# Patient Record
Sex: MALE | Race: BLACK OR AFRICAN AMERICAN | Employment: UNEMPLOYED | URBAN - METROPOLITAN AREA
[De-identification: names, ages, dates, MRNs, and addresses within clinical notes are randomized per-mention and may not be internally consistent; named-entity substitution may affect disease eponyms.]

---

## 2018-03-01 ENCOUNTER — HOSPITAL ENCOUNTER (EMERGENCY)
Age: 33
Discharge: HOME | End: 2018-03-01
Attending: EMERGENCY MEDICINE
Payer: SELF-PAY

## 2018-03-01 VITALS
SYSTOLIC BLOOD PRESSURE: 138 MMHG | TEMPERATURE: 98.1 F | HEIGHT: 73 IN | HEART RATE: 85 BPM | OXYGEN SATURATION: 97 % | RESPIRATION RATE: 16 BRPM | DIASTOLIC BLOOD PRESSURE: 100 MMHG | BODY MASS INDEX: 31.81 KG/M2 | WEIGHT: 240 LBS

## 2018-03-01 DIAGNOSIS — H10.33 ACUTE CONJUNCTIVITIS OF BOTH EYES, UNSPECIFIED ACUTE CONJUNCTIVITIS TYPE: Primary | ICD-10-CM

## 2018-03-01 PROCEDURE — 99282 EMERGENCY DEPT VISIT SF MDM: CPT

## 2018-03-01 PROCEDURE — 74011000250 HC RX REV CODE- 250: Performed by: NURSE PRACTITIONER

## 2018-03-01 RX ORDER — POLYMYXIN B SULFATE AND TRIMETHOPRIM 1; 10000 MG/ML; [USP'U]/ML
1 SOLUTION OPHTHALMIC EVERY 6 HOURS
Qty: 10 ML | Refills: 0 | Status: SHIPPED | OUTPATIENT
Start: 2018-03-01 | End: 2018-03-06

## 2018-03-01 RX ORDER — ALBUTEROL SULFATE 0.83 MG/ML
5 SOLUTION RESPIRATORY (INHALATION)
COMMUNITY
End: 2020-10-20

## 2018-03-01 RX ADMIN — FLUORESCEIN SODIUM 2 STRIP: 1 STRIP OPHTHALMIC at 12:36

## 2018-03-01 NOTE — DISCHARGE INSTRUCTIONS
Pinkeye: Care Instructions  Your Care Instructions    Pinkeye is redness and swelling of the eye surface and the conjunctiva (the lining of the eyelid and the covering of the white part of the eye). Pinkeye is also called conjunctivitis. Pinkeye is often caused by infection with bacteria or a virus. Dry air, allergies, smoke, and chemicals are other common causes. Pinkeye often clears on its own in 7 to 10 days. Antibiotics only help if the pinkeye is caused by bacteria. Pinkeye caused by infection spreads easily. If an allergy or chemical is causing pinkeye, it will not go away unless you can avoid whatever is causing it. Follow-up care is a key part of your treatment and safety. Be sure to make and go to all appointments, and call your doctor if you are having problems. It's also a good idea to know your test results and keep a list of the medicines you take. How can you care for yourself at home? · Wash your hands often. Always wash them before and after you treat pinkeye or touch your eyes or face. · Use moist cotton or a clean, wet cloth to remove crust. Wipe from the inside corner of the eye to the outside. Use a clean part of the cloth for each wipe. · Put cold or warm wet cloths on your eye a few times a day if the eye hurts. · Do not wear contact lenses or eye makeup until the pinkeye is gone. Throw away any eye makeup you were using when you got pinkeye. Clean your contacts and storage case. If you wear disposable contacts, use a new pair when your eye has cleared and it is safe to wear contacts again. · If the doctor gave you antibiotic ointment or eyedrops, use them as directed. Use the medicine for as long as instructed, even if your eye starts looking better soon. Keep the bottle tip clean, and do not let it touch the eye area. · To put in eyedrops or ointment:  ¨ Tilt your head back, and pull your lower eyelid down with one finger.   ¨ Drop or squirt the medicine inside the lower lid.  ¨ Close your eye for 30 to 60 seconds to let the drops or ointment move around. ¨ Do not touch the ointment or dropper tip to your eyelashes or any other surface. · Do not share towels, pillows, or washcloths while you have pinkeye. When should you call for help? Call your doctor now or seek immediate medical care if:  ? · You have pain in your eye, not just irritation on the surface. ? · You have a change in vision or loss of vision. ? · You have an increase in discharge from the eye.   ? · Your eye has not started to improve or begins to get worse within 48 hours after you start using antibiotics. ? · Pinkeye lasts longer than 7 days. ? Watch closely for changes in your health, and be sure to contact your doctor if you have any problems. Where can you learn more? Go to http://garima-jeannie.info/. Enter Y392 in the search box to learn more about \"Pinkeye: Care Instructions. \"  Current as of: March 20, 2017  Content Version: 11.4  © 7450-3901 [employer]. Care instructions adapted under license by [employer] (which disclaims liability or warranty for this information). If you have questions about a medical condition or this instruction, always ask your healthcare professional. [address] any warranty or liability for your use of this information.

## 2018-03-01 NOTE — Clinical Note
Thank you for allowing us to care for you today. Please follow-up with your Primary Care provider in the next 2-3 days if your symptoms do not improve. Plan for home:  
 
Wash face and pat dry. When washing face use a different part of the wash clot h around each eye. Instill 1-2 drops of the Polytrim eye drop into both eyes. For the first 24 hours use it every 4 hours. For the remaining 4 days use the drops every 6 hours. Fifteen minutes later you may use Opcon-A for itchy eyes. You may also use l iquid tears, just wait 15 minutes after using antibiotic eye drops. If you wear contact lens stop wearing for the next 5 days and only wear your glasses. Bacterial conjunctivitis is contagious. It can easily be spread. Wash your hands before and after t ouching your eye or wiping your eye with a tissue. Use separate tissues for each eye.

## 2018-03-01 NOTE — ED PROVIDER NOTES
HPI Comments: Patient is a 68-year-old male who comes to the ED today via private vehicle as ordered by family member with complaints of bilateral eye itching and drainage. Patient states his symptoms started yesterday. His left eye and he is to keep with crusting and drainage. He denies any VA changes, pain with eye movement, fever, chills, cold or allergy symptoms. He is starting to have symptoms now in his right eye and felt should have it checked out. He has a past medical history significant for asthma. He has used his albuterol inhaler recently. He has no further complaints at this time. Primary care provider:none    The history is provided by the patient. No  was used. Past Medical History:   Diagnosis Date    Asthma        History reviewed. No pertinent surgical history. History reviewed. No pertinent family history. Social History     Social History    Marital status: SINGLE     Spouse name: N/A    Number of children: N/A    Years of education: N/A     Occupational History    Not on file. Social History Main Topics    Smoking status: Former Smoker    Smokeless tobacco: Never Used    Alcohol use Yes    Drug use: No    Sexual activity: Not on file     Other Topics Concern    Not on file     Social History Narrative    No narrative on file         ALLERGIES: Review of patient's allergies indicates no known allergies. Review of Systems   Constitutional: Negative for appetite change, chills and fever. HENT: Negative. Eyes: Positive for discharge, redness and itching. Negative for photophobia, pain and visual disturbance. Respiratory: Negative for cough, shortness of breath and wheezing. Cardiovascular: Negative for chest pain. Gastrointestinal: Negative for abdominal pain, constipation, diarrhea, nausea and vomiting. Genitourinary: Negative for dysuria and urgency. Musculoskeletal: Negative for back pain.    Skin: Negative for color change and rash. Neurological: Negative for dizziness and headaches. Psychiatric/Behavioral: Negative. All other systems reviewed and are negative. Vitals:    03/01/18 1226   BP: (!) 138/100   Pulse: 85   Resp: 16   Temp: 98.1 °F (36.7 °C)   SpO2: 97%   Weight: 108.9 kg (240 lb)   Height: 6' 1\" (1.854 m)            Physical Exam   Constitutional: He appears well-developed and well-nourished. HENT:   Head: Atraumatic. Eyes: EOM are normal. Pupils are equal, round, and reactive to light. Left eye exhibits chemosis and discharge. Left eye exhibits no exudate and no hordeolum. No foreign body present in the left eye. Left conjunctiva is injected. Left conjunctiva has no hemorrhage. No scleral icterus. Right eye exhibits normal extraocular motion. Left eye exhibits normal extraocular motion. Slit lamp exam:       The right eye shows no corneal abrasion, no corneal flare, no corneal ulcer, no foreign body, no hyphema, no hypopyon and no fluorescein uptake. The left eye shows no corneal abrasion, no corneal flare, no corneal ulcer, no foreign body, no hyphema, no hypopyon and no fluorescein uptake. Right eye has a dry surface. 20/25  Left eye with inflamed lid margins. 20/20  Bilateral VA 20/20   Neck: No tracheal deviation present. Pulmonary/Chest: Effort normal. No respiratory distress. Musculoskeletal: Normal range of motion. Neurological: He is alert. Skin: Skin is warm and dry. Psychiatric: He has a normal mood and affect. His behavior is normal. Judgment and thought content normal.   Nursing note and vitals reviewed. [facility]      ED Course       Eye Stain    Date/Time: 3/1/2018 1:10 PM    Performed by: NP        Corneal abrasion was not present on eyelid eversion. Cornea is clear. Anterior chamber is clear.     Patient tolerance: Patient tolerated the procedure well with no immediate complications  My total time at bedside, performing this procedure was 1-15 minutes. Bacterial vs viral vs allergic conjunctivitis. Will treat as bacterial. Polytrim & OpCon A for itching. 1:10 PM  The patient has been reevaluated. The patient is ready for discharge. The patient's signs, symptoms, diagnosis, and discharge instructions have been discussed and the patient/ family has conveyed their understanding. The patient is to follow up as recommended or return to the ED should their symptoms worsen. Plan has been discussed and the patient is in agreement. LABORATORY TESTS:  Labs Reviewed - No data to display    IMAGING RESULTS:  No results found. MEDICATIONS GIVEN:  Medications   fluorescein (FUL-SENIA) 1 mg ophthalmic strip 2 Strip (2 Strips Both Eyes Given by Provider 3/1/18 0919)       IMPRESSION:  1. Acute conjunctivitis of both eyes, unspecified acute conjunctivitis type        PLAN:  1. Current Discharge Medication List      START taking these medications    Details   trimethoprim-polymyxin b (POLYTRIM) ophthalmic solution Administer 1 Drop to both eyes every six (6) hours for 5 days. Indications: BACTERIAL CONJUNCTIVITIS  Qty: 10 mL, Refills: 0      naphazoline-pheniramine (OPCON-A) ophthalmic solution Administer 1 Drop to both eyes three (3) times daily as needed. Indications: OCULAR ITCHING  Qty: 5 mL, Refills: 0           2. Follow-up Information     Follow up With Details Comments [id number] Schedule an appointment as soon as possible for a visit As needed. Luis M Schedule an appointment as soon as possible for a visit As needed. Primary care referral [address] [place] [address] Ricardo Duran [address]  [phone]    [facility] EMERGENCY DEPT  As needed, If symptoms worsen Narendra [address] [place] [address]  [phone]        3.      Return to ED for new or worsening symptoms       Antonia Schulte Salma Pringle, NP

## 2018-03-01 NOTE — ED TRIAGE NOTES
Pt ambulates to treatment area he states that the day before yesterday he was in his old car touching the stearing wheel and other things then he thinks he touched his left eye. When he got up yesterday the left eye was \"messing up\" and it was swollen and tearing.   Today the right eye is beginning to \"mess up\"  So he thinks it might be from his old car

## 2018-03-01 NOTE — ED NOTES
Pt given discharge instructions by Jessica Durán NP he verbalizes an understanding pt stable at time of discharge

## 2018-03-11 ENCOUNTER — HOSPITAL ENCOUNTER (EMERGENCY)
Age: 33
Discharge: HOME | End: 2018-03-11
Attending: EMERGENCY MEDICINE
Payer: SELF-PAY

## 2018-03-11 VITALS
OXYGEN SATURATION: 98 % | HEART RATE: 78 BPM | WEIGHT: 244.05 LBS | DIASTOLIC BLOOD PRESSURE: 80 MMHG | SYSTOLIC BLOOD PRESSURE: 135 MMHG | HEIGHT: 73 IN | RESPIRATION RATE: 18 BRPM | BODY MASS INDEX: 32.34 KG/M2 | TEMPERATURE: 98.2 F

## 2018-03-11 DIAGNOSIS — H57.89 IRRITATION OF LEFT EYE: Primary | ICD-10-CM

## 2018-03-11 PROCEDURE — 99283 EMERGENCY DEPT VISIT LOW MDM: CPT

## 2018-03-11 PROCEDURE — 74011000250 HC RX REV CODE- 250: Performed by: EMERGENCY MEDICINE

## 2018-03-11 RX ORDER — ALBUTEROL SULFATE 90 UG/1
AEROSOL, METERED RESPIRATORY (INHALATION)
COMMUNITY
End: 2020-10-20

## 2018-03-11 RX ORDER — TETRACAINE HYDROCHLORIDE 5 MG/ML
1 SOLUTION OPHTHALMIC
Status: COMPLETED | OUTPATIENT
Start: 2018-03-11 | End: 2018-03-11

## 2018-03-11 RX ADMIN — TETRACAINE HYDROCHLORIDE 1 DROP: 5 SOLUTION OPHTHALMIC at 21:26

## 2018-03-11 RX ADMIN — FLUORESCEIN SODIUM 1 STRIP: 1 STRIP OPHTHALMIC at 21:26

## 2018-03-12 NOTE — ED NOTES
Patient discharged home after receiving discharge instructions from MD.  Patient and family voiced understanding and doesn't have any questions at this time. Patient in no distress at this time.

## 2018-03-12 NOTE — ED PROVIDER NOTES
HPI Comments: Cameron Encarnacoin is a 28 yo M with left eye redness and mild pain for the past 2 weeks. He was seen here on 3/1 and was prescribed polytrim drops which he had been taking but the eye pain and redness is not improving. [facility] but never made an appointment. He describes the pain and an itching discomfort that increases when he opens his eye in bright light. He has had colorless crusting when he wakes from sleep and watery drainage. He denies vision changes but states that it is hard to get his left eye lid open to be able to see because it feels swollen. Past Medical History:   Diagnosis Date    Asthma        History reviewed. No pertinent surgical history. History reviewed. No pertinent family history. Social History     Social History    Marital status: SINGLE     Spouse name: N/A    Number of children: N/A    Years of education: N/A     Occupational History    Not on file. Social History Main Topics    Smoking status: Former Smoker    Smokeless tobacco: Never Used    Alcohol use Yes    Drug use: No    Sexual activity: Not on file     Other Topics Concern    Not on file     Social History Narrative         ALLERGIES: Review of patient's allergies indicates no known allergies. Review of Systems   Constitutional: Negative for fever. HENT: Negative for sore throat. Eyes: Positive for discharge, redness and itching. Negative for visual disturbance. Respiratory: Negative for cough. Cardiovascular: Negative for chest pain. Gastrointestinal: Negative for abdominal pain. Genitourinary: Negative for dysuria. Musculoskeletal: Negative for back pain. Skin: Negative for rash. Neurological: Negative for headaches.        Vitals:    03/11/18 2045   BP: 140/90   Pulse: 85   Resp: 16   Temp: 98.2 °F (36.8 °C)   SpO2: 98%   Weight: 110.7 kg (244 lb 0.8 oz)   Height: 6' 1\" (1.854 m)            Physical Exam Constitutional: He appears well-developed and well-nourished. No distress. HENT:   Head: Normocephalic and atraumatic. Mouth/Throat: Oropharynx is clear and moist.   Eyes: EOM are normal. Pupils are equal, round, and reactive to light. No foreign body present in the right eye. No foreign body present in the left eye. Right conjunctiva is not injected. Left conjunctiva is injected. Right eye exhibits normal extraocular motion. Left eye exhibits normal extraocular motion. Slit lamp exam:       The left eye shows no corneal abrasion, no corneal ulcer, no foreign body and no fluorescein uptake. tonopen  OD 19  OS 15    Visual acuity  OD 20/20  OS 20/50  Bilateral 20/20   Neck: Normal range of motion and phonation normal.   Cardiovascular: Normal rate and intact distal pulses. Pulmonary/Chest: Effort normal. No respiratory distress. Abdominal: He exhibits no distension. Musculoskeletal: Normal range of motion. He exhibits no tenderness. Neurological: He is alert. He is not disoriented. He exhibits normal muscle tone. Skin: Skin is warm and dry. Nursing note and vitals reviewed. MDM    Patients with persistent left eye redness and irritation following 2 week treatment with polytrim. May be allergic conjunctivitis or other cause. No corneal abrasion seen with fluorescin exam.   kaylie pen pressures normal.  May be allergic conjunctivitis, caused by polytrim use. Does not appear bacterial at this time. Advised to discontinue polytrim. Follow-up with [facility].    ED Course       Procedures

## 2018-03-12 NOTE — ED TRIAGE NOTES
Started with left eye, but now both eyes are red per patient. Started almost 2 weeks ago and was seen here. Has been using drops that he was given, but not getting better. Drainage from left eye, unsure of color. No pain.

## 2018-03-12 NOTE — ED NOTES
[facility] communication provided and informed of purposeful rounding to include collaboration of entire care team; patient acknowledged understanding. Pt watching TV. Call bell within reach, will continue to monitor.

## 2020-10-20 ENCOUNTER — APPOINTMENT (OUTPATIENT)
Dept: GENERAL RADIOLOGY | Age: 35
End: 2020-10-20
Attending: EMERGENCY MEDICINE
Payer: MEDICAID

## 2020-10-20 ENCOUNTER — HOSPITAL ENCOUNTER (EMERGENCY)
Age: 35
Discharge: HOME | End: 2020-10-20
Attending: EMERGENCY MEDICINE
Payer: MEDICAID

## 2020-10-20 VITALS
DIASTOLIC BLOOD PRESSURE: 91 MMHG | RESPIRATION RATE: 20 BRPM | WEIGHT: 235.67 LBS | HEART RATE: 115 BPM | OXYGEN SATURATION: 98 % | HEIGHT: 73 IN | TEMPERATURE: 98.1 F | SYSTOLIC BLOOD PRESSURE: 160 MMHG | BODY MASS INDEX: 31.23 KG/M2

## 2020-10-20 DIAGNOSIS — R06.02 SOB (SHORTNESS OF BREATH): ICD-10-CM

## 2020-10-20 DIAGNOSIS — J06.9 VIRAL URI WITH COUGH: Primary | ICD-10-CM

## 2020-10-20 DIAGNOSIS — R07.89 CHEST TIGHTNESS: ICD-10-CM

## 2020-10-20 DIAGNOSIS — J45.901 ASTHMA WITH ACUTE EXACERBATION, UNSPECIFIED ASTHMA SEVERITY, UNSPECIFIED WHETHER PERSISTENT: ICD-10-CM

## 2020-10-20 PROCEDURE — 71045 X-RAY EXAM CHEST 1 VIEW: CPT

## 2020-10-20 PROCEDURE — 87635 SARS-COV-2 COVID-19 AMP PRB: CPT

## 2020-10-20 PROCEDURE — 74011636637 HC RX REV CODE- 636/637: Performed by: EMERGENCY MEDICINE

## 2020-10-20 PROCEDURE — 99285 EMERGENCY DEPT VISIT HI MDM: CPT

## 2020-10-20 PROCEDURE — 74011000250 HC RX REV CODE- 250: Performed by: EMERGENCY MEDICINE

## 2020-10-20 PROCEDURE — 93005 ELECTROCARDIOGRAM TRACING: CPT

## 2020-10-20 PROCEDURE — 94640 AIRWAY INHALATION TREATMENT: CPT

## 2020-10-20 RX ORDER — PREDNISONE 20 MG/1
60 TABLET ORAL
Status: COMPLETED | OUTPATIENT
Start: 2020-10-20 | End: 2020-10-20

## 2020-10-20 RX ORDER — PREDNISONE 20 MG/1
40 TABLET ORAL
Qty: 10 TAB | Refills: 0 | Status: SHIPPED | OUTPATIENT
Start: 2020-10-20 | End: 2020-10-25

## 2020-10-20 RX ORDER — ALBUTEROL SULFATE 90 UG/1
2 AEROSOL, METERED RESPIRATORY (INHALATION)
Qty: 1 INHALER | Refills: 0 | Status: SHIPPED | OUTPATIENT
Start: 2020-10-20

## 2020-10-20 RX ORDER — ALBUTEROL SULFATE 90 UG/1
2 AEROSOL, METERED RESPIRATORY (INHALATION)
COMMUNITY

## 2020-10-20 RX ORDER — GUAIFENESIN 1200 MG/1
1200 TABLET, EXTENDED RELEASE ORAL 2 TIMES DAILY
Qty: 10 TAB | Refills: 0 | Status: SHIPPED | OUTPATIENT
Start: 2020-10-20 | End: 2021-09-01

## 2020-10-20 RX ORDER — IPRATROPIUM BROMIDE AND ALBUTEROL SULFATE 2.5; .5 MG/3ML; MG/3ML
3 SOLUTION RESPIRATORY (INHALATION)
Status: COMPLETED | OUTPATIENT
Start: 2020-10-20 | End: 2020-10-20

## 2020-10-20 RX ORDER — PSEUDOEPHEDRINE HCL 30 MG
30 TABLET ORAL
Qty: 15 TAB | Refills: 0 | Status: SHIPPED | OUTPATIENT
Start: 2020-10-20 | End: 2020-10-24

## 2020-10-20 RX ADMIN — IPRATROPIUM BROMIDE AND ALBUTEROL SULFATE 3 ML: .5; 3 SOLUTION RESPIRATORY (INHALATION) at 22:54

## 2020-10-20 RX ADMIN — PREDNISONE 60 MG: 20 TABLET ORAL at 21:44

## 2020-10-20 NOTE — Clinical Note
[facility] EMERGENCY DEPT 
[address] [facility] [address] 
[phone] Work/School Note Date: 10/20/2020 To Whom It May concern: 
 
Amara Bella was evaulated by the following provider(s): 
Attending Provider: Nick Tran MD.   COVID19 virus is suspected. Per the CDC guidelines we recommend home isolation until the following conditions are all met: 1. At least 10 days have passed since symptoms first appeared and 2. At least 24 hours have passed since last fever without the use of fever-reducing medications and 
3. Symptoms (e.g., cough, shortness of breath) have improved Sincerely, 
 
 
 
 
Jesenia Rogers MD

## 2020-10-21 ENCOUNTER — PATIENT OUTREACH (OUTPATIENT)
Dept: CASE MANAGEMENT | Age: 35
End: 2020-10-21

## 2020-10-21 LAB
ATRIAL RATE: 124 BPM
CALCULATED P AXIS, ECG09: 68 DEGREES
CALCULATED R AXIS, ECG10: 94 DEGREES
CALCULATED T AXIS, ECG11: 26 DEGREES
COVID-19, XGCOVT: NOT DETECTED
DIAGNOSIS, 93000: NORMAL
HEALTH STATUS, XMCV2T: NORMAL
P-R INTERVAL, ECG05: 154 MS
Q-T INTERVAL, ECG07: 308 MS
QRS DURATION, ECG06: 86 MS
QTC CALCULATION (BEZET), ECG08: 442 MS
SPECIMEN TYPE, XMCV1T: NORMAL
VENTRICULAR RATE, ECG03: 124 BPM

## 2020-10-21 NOTE — ED PROVIDER NOTES
70-year-old male who has asthma presenting to the ER with 2 days of URI-like symptoms and the onset of shortness of breath and wheezing yesterday. Patient denies any fevers however having nasal congestion and a nonproductive cough. Wheezing with some chest tightness. Patient reports that he has been using his inhaler at home without improvement of his symptoms. No myalgias. No loss of taste or smell. No ear pain. No abdominal pain nausea vomiting or diarrhea no dysuria. No rash. No other sick contacts. Patient has not been taking any medications other than his inhaler at home. Patient has history of ICU admission with BiPAP due to asthma exacerbation from a cold several years ago. Other not patient reports his asthma has been controlled           Past Medical History:   Diagnosis Date    Asthma        History reviewed. No pertinent surgical history. History reviewed. No pertinent family history. Social History     Socioeconomic History    Marital status: SINGLE     Spouse name: Not on file    Number of children: Not on file    Years of education: Not on file    Highest education level: Not on file   Occupational History    Not on file   Social Needs    Financial resource strain: Not on file    Food insecurity     Worry: Not on file     Inability: Not on file    Transportation needs     Medical: Not on file     Non-medical: Not on file   Tobacco Use    Smoking status: Former Smoker    Smokeless tobacco: Never Used   Substance and Sexual Activity    Alcohol use:  Yes    Drug use: No    Sexual activity: Not on file   Lifestyle    Physical activity     Days per week: Not on file     Minutes per session: Not on file    Stress: Not on file   Relationships    Social connections     Talks on phone: Not on file     Gets together: Not on file     Attends [employer] service: Not on file     Active member of club or organization: Not on file     Attends meetings of clubs or organizations: Not on file     Relationship status: Not on file    Intimate partner violence     Fear of current or ex partner: Not on file     Emotionally abused: Not on file     Physically abused: Not on file     Forced sexual activity: Not on file   Other Topics Concern    Not on file   Social History Narrative    Not on file         ALLERGIES: Patient has no known allergies. Review of Systems   Constitutional: Negative for chills and fever. HENT: Positive for congestion, postnasal drip, rhinorrhea and sore throat. Eyes: Negative for pain. Respiratory: Positive for cough, chest tightness, shortness of breath and wheezing. Cardiovascular: Negative for chest pain. Gastrointestinal: Negative for abdominal pain, diarrhea, nausea and vomiting. Genitourinary: Negative for dysuria and flank pain. Musculoskeletal: Negative for back pain and neck pain. Skin: Negative for rash. Neurological: Negative for dizziness and headaches. Vitals:    10/20/20 2145 10/20/20 2200 10/20/20 2215 10/20/20 2300   BP: (!) 145/90 (!) 147/80 (!) 140/88 (!) 160/91   Pulse: (!) 115      Resp:    20   Temp:       SpO2: 98% 97% 95% 98%   Weight:       Height:                Physical Exam  Constitutional:       Appearance: He is well-developed. HENT:      Head: Normocephalic. Comments: B/l serous TM effusions. No bulging or drainage  Nasal congestion  Posterior oropharynx erythema, cobblestoning appearance. No edema, no enlarge tonsils or exduate. Eyes:      Conjunctiva/sclera: Conjunctivae normal.   Neck:      Musculoskeletal: Normal range of motion and neck supple. Cardiovascular:      Rate and Rhythm: Normal rate and regular rhythm. Pulmonary:      Effort: Pulmonary effort is normal. Tachypnea and prolonged expiration present. No accessory muscle usage or respiratory distress. Breath sounds: Wheezing (diffuse wheezing, poor air movement) present. No rhonchi or rales.    Abdominal:      General: Bowel sounds are normal.      Palpations: Abdomen is soft. Tenderness: There is no abdominal tenderness. Musculoskeletal: Normal range of motion. Skin:     General: Skin is warm. Capillary Refill: Capillary refill takes less than 2 seconds. Findings: No rash. Neurological:      Mental Status: He is alert and oriented to person, place, and time. Comments: No gross motor or sensory deficits          MDM  Number of Diagnoses or Management Options  Asthma with acute exacerbation, unspecified asthma severity, unspecified whether persistent:   Chest tightness:   SOB (shortness of breath):   Viral URI with cough:   Diagnosis management comments: Patient presenting with URI-like symptoms with exacerbation of his asthma not controlled with his rescue inhaler at home. Patient came in tachypneic with poor air movement and diffuse wheezing. No reported fevers. Had patient take 6 puffs of his home inhaler and given steroids. Repeated the 6 puffs of his home albuterol inhaler  Patient chest x-ray unremarkable. Patient had some mild improvement of his respiratory rate and was having improved air movement but still having wheezing. Given dual neb, after dual neb patient reports significant improvement of his breathing. Patient had good air movement with intermittent wheezes. Light of his asthma status and viral URI-like symptoms swab for Covid. Patient reports feeling well enough to be discharged home. Discussed symptomatic treatment and prescription for steroids for next 5 days. Discussed self-isolation until he is Covid results returned and his symptoms improved. Discussed follow-up Covid results on MyChart  Discussed the discharge impression and any labs and the results with the patient. Answered any questions and addressed any concerns.  Discussed the importance of following up with their primary care provider and/or specialist.  Discussed signs or symptoms that would warrant return back to the ER for further evaluation. The patient is agreeable with discharge. Amount and/or Complexity of Data Reviewed  Clinical lab tests: reviewed  Tests in the radiology section of CPT®: reviewed  Tests in the medicine section of CPT®: reviewed      ED Course as of Oct 20 2341   Tue Oct 20, 2020   2125 EKG: Sinus tachycardia rate of 124 bpm.  Normal intervals. Rightward axis, no ST elevation or depressions EKG interpreted by Stephy Davies MD      [ZD]   2139 Patient take 6 puffs of his home inhaler. [ZD]   7605 Patient taken additional 6 puffs of his home albuterol inhaler    [ZD]   8428 Reevaluation of lung status. Wheezing sounds louder than previous. Patient reports mild improvement of symptoms.   Will give patient duoneb    [ZD]      ED Course User Index  [ZD] Aurora Walsh MD       Critical Care  Performed by: Aurora Walsh MD  Authorized by: Aurora Walsh MD     Critical care provider statement:     Critical care time (minutes):  35    Critical care was necessary to treat or prevent imminent or life-threatening deterioration of the following conditions:  Respiratory failure    Critical care was time spent personally by me on the following activities:  Development of treatment plan with patient or surrogate, evaluation of patient's response to treatment, interpretation of cardiac output measurements, obtaining history from patient or surrogate, re-evaluation of patient's condition, pulse oximetry, ordering and review of radiographic studies, ordering and performing treatments and interventions and review of old charts    I assumed direction of critical care for this patient from another provider in my specialty: no            Recent Results (from the past 24 hour(s))   EKG, 12 LEAD, INITIAL    Collection Time: 10/20/20  9:25 PM   Result Value Ref Range    Ventricular Rate 124 BPM    Atrial Rate 124 BPM    P-R Interval 154 ms    QRS Duration 86 ms    Q-T Interval 308 ms    QTC Calculation (Bezet) 442 ms    Calculated P Axis 68 degrees    Calculated R Axis 94 degrees    Calculated T Axis 26 degrees    Diagnosis       Sinus tachycardia  Rightward axis  Borderline ECG  No previous ECGs available     SARS-COV-2    Collection Time: 10/20/20 11:00 PM   Result Value Ref Range    Specimen source Nasopharyngeal      SARS-CoV-2 PENDING     SARS-CoV-2 PENDING     Specimen source Nasopharyngeal      COVID-19 rapid test PENDING     Specimen type NP Swab      Health status PENDING     COVID-19 PENDING        Xr Chest Port    Result Date: 10/20/2020  EXAM:  XR CHEST PORT INDICATION:   cough, sob, wheezing COMPARISON: None. FINDINGS: AP radiograph of the chest was obtained. No evidence of focal consolidation. No pleural effusion or pneumothorax. Heart, dayana, mediastinum are within normal limits. No acute osseous abnormalities. IMPRESSION: No acute cardiopulmonary process.

## 2020-10-21 NOTE — ED TRIAGE NOTES
Pt rpts 2 day hx of astham exacerbation. Audible wheezing during triage. + minimal productive cough.

## 2020-10-21 NOTE — ED NOTES
Discharge note: The patient was discharged home in stable condition, accompanied by family member. The patient is alert and oriented, is in no respiratory distress. The patient's diagnosis, condition and treatment were explained to patient by Dr Asa Daugherty and reinforced by nurse. The patient expressed understanding of discharge instructions, prescriptions, and plan of care. A work note was given to pt. A discharge plan has been developed. A  was not involved in the process. Patient offered a wheelchair to ED lobby for discharge but declined at this time. Patient ambulatory to ED lobby to go home with family member.

## 2020-10-21 NOTE — PROGRESS NOTES
ED 10/20/2020: Viral URI/Cough/Asthma with acute exacerbation, unspecified asthma severity  Patient on Cov19 D/C [facility] Enrollment Report/fu Contact. Left message on voice mail with my contact information for return call. Need to assess for d/c needs post ED or Inpatient Admission.

## 2020-10-22 ENCOUNTER — PATIENT OUTREACH (OUTPATIENT)
Dept: CASE MANAGEMENT | Age: 35
End: 2020-10-22

## 2020-10-22 NOTE — PROGRESS NOTES
ED 10/20/2020: Viral URI/Cough/Asthma with acute exacerbation, unspecified asthma severity f/u  Patient resolved from Transition of Care episode on 10/22/2020. ACM/CTN was unsuccessful at contacting this patient today. Patient/family was not provided due to inability to reach,  the following resources and education related to COVID-19 during the initial call:                         Signs, symptoms and red flags related to COVID-19            CDC exposure and quarantine guidelines            [facility] exposure contact - [phone]            Contact for their local Department of Health                 Patient has not had any additional ED or hospital visits. No further outreach scheduled with this CTN/ACM. Episode of Care resolved. Patient has this CTN/ACM contact information if future needs arise. .cov  .covid  .

## 2021-09-01 ENCOUNTER — APPOINTMENT (OUTPATIENT)
Dept: GENERAL RADIOLOGY | Age: 36
End: 2021-09-01
Attending: EMERGENCY MEDICINE
Payer: MEDICAID

## 2021-09-01 ENCOUNTER — HOSPITAL ENCOUNTER (EMERGENCY)
Age: 36
Discharge: HOME | End: 2021-09-01
Attending: EMERGENCY MEDICINE
Payer: MEDICAID

## 2021-09-01 VITALS
SYSTOLIC BLOOD PRESSURE: 170 MMHG | TEMPERATURE: 99.1 F | DIASTOLIC BLOOD PRESSURE: 100 MMHG | HEIGHT: 73 IN | WEIGHT: 246.25 LBS | RESPIRATION RATE: 18 BRPM | HEART RATE: 93 BPM | OXYGEN SATURATION: 97 % | BODY MASS INDEX: 32.64 KG/M2

## 2021-09-01 DIAGNOSIS — R07.89 CHEST WALL PAIN: Primary | ICD-10-CM

## 2021-09-01 DIAGNOSIS — S39.012A BACK STRAIN, INITIAL ENCOUNTER: ICD-10-CM

## 2021-09-01 PROCEDURE — 99282 EMERGENCY DEPT VISIT SF MDM: CPT

## 2021-09-01 PROCEDURE — 74011250636 HC RX REV CODE- 250/636: Performed by: EMERGENCY MEDICINE

## 2021-09-01 PROCEDURE — 96372 THER/PROPH/DIAG INJ SC/IM: CPT

## 2021-09-01 PROCEDURE — 71046 X-RAY EXAM CHEST 2 VIEWS: CPT

## 2021-09-01 RX ORDER — KETOROLAC TROMETHAMINE 30 MG/ML
60 INJECTION, SOLUTION INTRAMUSCULAR; INTRAVENOUS
Status: COMPLETED | OUTPATIENT
Start: 2021-09-01 | End: 2021-09-01

## 2021-09-01 RX ORDER — NAPROXEN 500 MG/1
500 TABLET ORAL 2 TIMES DAILY WITH MEALS
Qty: 20 TABLET | Refills: 0 | Status: SHIPPED | OUTPATIENT
Start: 2021-09-01 | End: 2022-05-29

## 2021-09-01 RX ORDER — CYCLOBENZAPRINE HCL 10 MG
10 TABLET ORAL 2 TIMES DAILY
Qty: 20 TABLET | Refills: 0 | Status: SHIPPED | OUTPATIENT
Start: 2021-09-01 | End: 2022-05-29

## 2021-09-01 RX ADMIN — KETOROLAC TROMETHAMINE 60 MG: 30 INJECTION, SOLUTION INTRAMUSCULAR at 04:19

## 2021-09-01 NOTE — ED PROVIDER NOTES
57-year-old male with a past medical history significant for asthma who presents to the ED with a complaint of midsternal chest pain upper back pain for the past 2 to 3 days, severity 5 out of 10, worse with deep breaths and movement, accompanied by nausea and diaphoresis. The patient has taken over-the-counter remedies with Tylenol or ibuprofen without any significant relief of symptom and discomfort. The patient stated that he woke up from acting that he may have twisted his back. He denies any fever chills, headache, nausea, vomiting, constipation or dysuria dizziness, extremity weakness           Past Medical History:   Diagnosis Date    Asthma        History reviewed. No pertinent surgical history. History reviewed. No pertinent family history. Social History     Socioeconomic History    Marital status: SINGLE     Spouse name: Not on file    Number of children: Not on file    Years of education: Not on file    Highest education level: Not on file   Occupational History    Not on file   Tobacco Use    Smoking status: Former Smoker    Smokeless tobacco: Never Used   Substance and Sexual Activity    Alcohol use: Yes    Drug use: No    Sexual activity: Not on file   Other Topics Concern    Not on file   Social History Narrative    Not on file     Social Determinants of Health     Financial Resource Strain:     Difficulty of Paying Living Expenses:    Food Insecurity:     Worried About Running Out of Food in the Last Year:     [address] in the Last Year:    Transportation Needs:     Lack of Transportation (Medical):      Lack of Transportation (Non-Medical):    Physical Activity:     Days of Exercise per Week:     Minutes of Exercise per Session:    Stress:     Feeling of Stress :    Social Connections:     Frequency of Communication with Friends and Family:     Frequency of Social Gatherings with Friends and Family:     Attends Jain Services:     Active Member of [employer] or Organizations:     Attends Club or Organization Meetings:     Marital Status:    Intimate Partner Violence:     Fear of Current or Ex-Partner:     Emotionally Abused:     Physically Abused:     Sexually Abused: ALLERGIES: Patient has no known allergies. Review of Systems   All other systems reviewed and are negative. Vitals:    09/01/21 0325   BP: (!) 170/100   Pulse: 93   Resp: 18   Temp: 99.1 °F (37.3 °C)   SpO2: 97%   Weight: 111.7 kg (246 lb 4.1 oz)   Height: 6' 1\" (1.854 m)            Physical Exam  Vitals and nursing note reviewed. Exam conducted with a chaperone present. CONSTITUTIONAL: Well-appearing; well-nourished; in no apparent distress  HEAD: Normocephalic; atraumatic  EYES: PERRL; EOM intact; conjunctiva and sclera are clear bilaterally. ENT: No rhinorrhea; normal pharynx with no tonsillar hypertrophy; mucous membranes pink/moist, no erythema, no exudate. NECK: Supple; non-tender; no cervical lymphadenopathy  CARD: Normal S1, S2; no murmurs, rubs, or gallops. Regular rate and rhythm. RESP: Normal respiratory effort; breath sounds clear and equal bilaterally; no wheezes, rhonchi, or rales. Midsternal chest wall tenderness on palpation, movement of the torso and both arms. ABD: Normal bowel sounds; non-distended; non-tender; no palpable organomegaly, no masses, no bruits. Back Exam: Normal inspection; no vertebral point tenderness, no CVA tenderness. Normal range of motion. EXT: Normal ROM in all four extremities; non-tender to palpation; no swelling or deformity; distal pulses are normal, no edema. SKIN: Warm; dry; no rash.   NEURO:Alert and oriented x 3, coherent, FLORI-XII grossly intact, sensory and motor are non-focal.        MDM  Number of Diagnoses or Management Options  Diagnosis management comments: Assessment: 81-year-old male who presents to the ED with a complaint of pleuritic chest pain that is palpable and reproducible on exam consistent with musculoskeletal etiology. The patient appears hemodynamically stable. The patient has not been vaccinated against COVID-19 infection. Plan: Chest x-ray/education, reassurance, symptomatic treatment/serial exam/ Monitor and Reevaluate. Amount and/or Complexity of Data Reviewed  Clinical lab tests: ordered and reviewed  Tests in the radiology section of CPT®: ordered and reviewed  Tests in the medicine section of CPT®: ordered and reviewed  Discussion of test results with the performing providers: yes  Decide to obtain previous medical records or to obtain history from someone other than the patient: yes  Obtain history from someone other than the patient: yes  Review and summarize past medical records: yes  Discuss the patient with other providers: yes  Independent visualization of images, tracings, or specimens: yes    Risk of Complications, Morbidity, and/or Mortality  Presenting problems: moderate  Diagnostic procedures: moderate  Management options: moderate    Patient Progress  Patient progress: stable         Procedures    XRAY INTERPRETATION (ED MD)  Chest Xray  No acute process seen. Normal heart size. No bony abnormalities. No infiltrate. Davis Vu MD 3:38 AM      Progress Note:   Pt has been reexamined by Kym Thacker MD. Pt is feeling much better. Symptoms have improved. All available results have been reviewed with pt and any available family. Pt understands sx, dx, and tx in ED. Care plan has been outlined and questions have been answered. Pt is ready to go home. Will send home on chest wall pain and pleurisy instruction. Prescription of naproxen and Flexeril. . Outpatient referral with PCP as needed. Written by Kym Thacker MD,3:38 AM    .   .

## 2021-09-01 NOTE — Clinical Note
[address] EMERGENCY DEPT  [address] [facility] [address] [place] [id number] [phone]    Work/School Note    Date: 9/1/2021    To Whom It May concern:    Charli Seymour was seen and treated today in the emergency room by the following provider(s):  Attending Provider: Jose Dillon MD.      Charli Seymour is excused from work/school on 09/01/21 and 09/02/21. He is medically clear to return to work/school on 9/3/2021.        Sincerely,          Nixon Frankel MD

## 2022-05-29 ENCOUNTER — HOSPITAL ENCOUNTER (EMERGENCY)
Age: 37
Discharge: HOME | End: 2022-05-29
Attending: EMERGENCY MEDICINE
Payer: MEDICAID

## 2022-05-29 VITALS
SYSTOLIC BLOOD PRESSURE: 157 MMHG | HEART RATE: 92 BPM | OXYGEN SATURATION: 98 % | WEIGHT: 240 LBS | BODY MASS INDEX: 31.81 KG/M2 | HEIGHT: 73 IN | TEMPERATURE: 98.2 F | RESPIRATION RATE: 18 BRPM | DIASTOLIC BLOOD PRESSURE: 109 MMHG

## 2022-05-29 DIAGNOSIS — R06.02 SOB (SHORTNESS OF BREATH): ICD-10-CM

## 2022-05-29 DIAGNOSIS — F43.0 STRESS REACTION: Primary | ICD-10-CM

## 2022-05-29 PROCEDURE — 74011250637 HC RX REV CODE- 250/637: Performed by: EMERGENCY MEDICINE

## 2022-05-29 PROCEDURE — 99282 EMERGENCY DEPT VISIT SF MDM: CPT

## 2022-05-29 RX ORDER — HYDROXYZINE 50 MG/1
50 TABLET, FILM COATED ORAL
Qty: 20 TABLET | Refills: 0 | Status: SHIPPED | OUTPATIENT
Start: 2022-05-29 | End: 2022-06-08

## 2022-05-29 RX ORDER — HYDROXYZINE 25 MG/1
50 TABLET, FILM COATED ORAL
Status: COMPLETED | OUTPATIENT
Start: 2022-05-29 | End: 2022-05-29

## 2022-05-29 RX ADMIN — HYDROXYZINE HYDROCHLORIDE 50 MG: 25 TABLET, FILM COATED ORAL at 00:56

## 2022-05-29 NOTE — ED TRIAGE NOTES
Pt c/o of sob x 3 days. States hx of asthma and states his MDI is not helping and feels like he needs a breathing treatment.

## 2022-05-29 NOTE — ED PROVIDER NOTES
The history is provided by the patient. Shortness of Breath  This is a new problem. The average episode lasts 3 days. The problem occurs continuously. The current episode started more than 2 days ago. The problem has not changed since onset. Pertinent negatives include no cough, no sputum production and no wheezing. Associated symptoms comments: Tightness with breathing, feels like he can't get an adequate breath. It is unknown what precipitated the problem. He has tried beta-agonist inhalers for the symptoms. The treatment provided no relief. Associated medical issues include asthma. Past Medical History:   Diagnosis Date    Asthma        No past surgical history on file. History reviewed. No pertinent family history. Social History     Socioeconomic History    Marital status: SINGLE     Spouse name: Not on file    Number of children: Not on file    Years of education: Not on file    Highest education level: Not on file   Occupational History    Not on file   Tobacco Use    Smoking status: Former Smoker    Smokeless tobacco: Never Used   Substance and Sexual Activity    Alcohol use: Yes    Drug use: No    Sexual activity: Not on file   Other Topics Concern    Not on file   Social History Narrative    Not on file     Social Determinants of Health     Financial Resource Strain:     Difficulty of Paying Living Expenses: Not on file   Food Insecurity:     Worried About Running Out of Food in the Last Year: Not on file    Kathleen of Food in the Last Year: Not on file   Transportation Needs:     Lack of Transportation (Medical): Not on file    Lack of Transportation (Non-Medical):  Not on file   Physical Activity:     Days of Exercise per Week: Not on file    Minutes of Exercise per Session: Not on file   Stress:     Feeling of Stress : Not on file   Social Connections:     Frequency of Communication with Friends and Family: Not on file    Frequency of Social Gatherings with Friends and Family: Not on file    Attends Yarsanism Services: Not on file    Active Member of Clubs or Organizations: Not on file    Attends Club or Organization Meetings: Not on file    Marital Status: Not on file   Intimate Partner Violence:     Fear of Current or Ex-Partner: Not on file    Emotionally Abused: Not on file    Physically Abused: Not on file    Sexually Abused: Not on file   Housing Stability:     Unable to Pay for Housing in the Last Year: Not on file    Number of [place] in the Last Year: Not on file    Unstable Housing in the Last Year: Not on file         ALLERGIES: Patient has no known allergies. Review of Systems   Respiratory: Positive for shortness of breath. Negative for cough, sputum production and wheezing. All other systems reviewed and are negative. Vitals:    05/29/22 0033   BP: (!) 157/109   Pulse: 92   Resp: 18   Temp: 98.2 °F (36.8 °C)   SpO2: 98%   Weight: 108.9 kg (240 lb)   Height: 6' 1\" (1.854 m)            Physical Exam  Vitals and nursing note reviewed. Constitutional:       General: He is not in acute distress. Appearance: He is well-developed. HENT:      Head: Normocephalic and atraumatic. Eyes:      Conjunctiva/sclera: Conjunctivae normal.   Neck:      Trachea: No tracheal deviation. Cardiovascular:      Rate and Rhythm: Normal rate and regular rhythm. Heart sounds: Normal heart sounds. Pulmonary:      Effort: Pulmonary effort is normal. No tachypnea, accessory muscle usage or respiratory distress. Breath sounds: Normal breath sounds. No wheezing, rhonchi or rales. Abdominal:      General: There is no distension. Musculoskeletal:         General: No deformity. Normal range of motion. Cervical back: Neck supple. Skin:     General: Skin is warm and dry. Neurological:      Mental Status: He is alert. Cranial Nerves: No cranial nerve deficit.    Psychiatric:         Behavior: Behavior normal.          MDM 30-year-old male presents with feeling like his asthma has been getting worse over the last 3 days. He has been trying his albuterol inhaler without much relief but not using a spacer chamber. He was provided a spacer chamber for home use if he needs it. He is not wheezing here and he ambulated without any increased work of breathing speaking in full sentences with normal oxygen saturation. I do not feel he is having an acute asthma flare. He tells me he has been under increased stress and his description is more consistent with stress episode, given Atarax for help with anxiolysis and provided a short course for outpatient care if he has recurrence of symptoms. I do not feel that albuterol will help him feel better given his current signs and symptoms. Plan to follow up with PCP as needed and return precautions discussed for worsening or new concerning symptoms.      Procedures

## 2022-08-11 ENCOUNTER — HOSPITAL ENCOUNTER (EMERGENCY)
Age: 37
Discharge: HOME HEALTH | End: 2022-08-11
Attending: EMERGENCY MEDICINE
Payer: MEDICAID

## 2022-08-11 VITALS
BODY MASS INDEX: 32.96 KG/M2 | SYSTOLIC BLOOD PRESSURE: 167 MMHG | HEART RATE: 99 BPM | TEMPERATURE: 99 F | WEIGHT: 235.45 LBS | RESPIRATION RATE: 15 BRPM | OXYGEN SATURATION: 96 % | HEIGHT: 71 IN | DIASTOLIC BLOOD PRESSURE: 102 MMHG

## 2022-08-11 DIAGNOSIS — J02.9 ACUTE PHARYNGITIS, UNSPECIFIED ETIOLOGY: Primary | ICD-10-CM

## 2022-08-11 LAB
COVID-19 RAPID TEST, COVR: NOT DETECTED
DEPRECATED S PYO AG THROAT QL EIA: NEGATIVE
SOURCE, COVRS: NORMAL

## 2022-08-11 PROCEDURE — 87880 STREP A ASSAY W/OPTIC: CPT

## 2022-08-11 PROCEDURE — 99283 EMERGENCY DEPT VISIT LOW MDM: CPT

## 2022-08-11 PROCEDURE — 87070 CULTURE OTHR SPECIMN AEROBIC: CPT

## 2022-08-11 PROCEDURE — 87635 SARS-COV-2 COVID-19 AMP PRB: CPT

## 2022-08-11 RX ORDER — CLINDAMYCIN HYDROCHLORIDE 300 MG/1
300 CAPSULE ORAL 3 TIMES DAILY
Qty: 21 CAPSULE | Refills: 0 | Status: SHIPPED | OUTPATIENT
Start: 2022-08-11 | End: 2022-08-18

## 2022-08-12 NOTE — ED TRIAGE NOTES
Pt arrived ambulatory to ER with complaints of a sore throat x1 week. Pt denies any other symptoms. Pt still has tonsils.  Pt endorses smoking \"sometimes\"

## 2022-08-12 NOTE — ED PROVIDER NOTES
He presents with complaints of a weeklong history of sore throat. It has been constant. He denies fever, cough, congestion. The pain is moderate and worse with swallowing. He took 3 days worth of his girlfriends Augmentin with no relief. Past Medical History:   Diagnosis Date    Asthma     High cholesterol     Hypertension        History reviewed. No pertinent surgical history. History reviewed. No pertinent family history. Social History     Socioeconomic History    Marital status: SINGLE     Spouse name: Not on file    Number of children: Not on file    Years of education: Not on file    Highest education level: Not on file   Occupational History    Not on file   Tobacco Use    Smoking status: Former    Smokeless tobacco: Never   Substance and Sexual Activity    Alcohol use: Yes    Drug use: No    Sexual activity: Not on file   Other Topics Concern    Not on file   Social History Narrative    Not on file     Social Determinants of Health     Financial Resource Strain: Not on file   Food Insecurity: Not on file   Transportation Needs: Not on file   Physical Activity: Not on file   Stress: Not on file   Social Connections: Not on file   Intimate Partner Violence: Not on file   Housing Stability: Not on file         ALLERGIES: Patient has no known allergies. Review of Systems   All other systems reviewed and are negative. Vitals:    08/11/22 2033   BP: (!) 167/102   Pulse: 99   Resp: 15   Temp: 99 °F (37.2 °C)   SpO2: 96%   Weight: 106.8 kg (235 lb 7.2 oz)   Height: 5' 11\" (1.803 m)            Physical Exam  Vitals and nursing note reviewed. Constitutional:       Appearance: He is well-developed. HENT:      Head: Normocephalic and atraumatic. Mouth/Throat:      Mouth: Mucous membranes are dry. Pharynx: Posterior oropharyngeal erythema present. Eyes:      Conjunctiva/sclera: Conjunctivae normal.   Neck:      Trachea: No tracheal deviation.    Cardiovascular:      Rate and Rhythm: Normal rate. Pulmonary:      Effort: Pulmonary effort is normal.   Abdominal:      General: There is no distension. Skin:     General: Skin is dry. Neurological:      Mental Status: He is alert. MDM         Procedures      Assessment/plan: Sore throat -likely viral but will treat due to the duration of his symptoms. Clindamycin. Reassuring appearance/exam with stable vital signs. PCP follow-up. Return precautions.   Mir Briggs MD

## 2022-08-14 LAB
BACTERIA SPEC CULT: NORMAL
SERVICE CMNT-IMP: NORMAL